# Patient Record
Sex: FEMALE | Race: WHITE | NOT HISPANIC OR LATINO | Employment: OTHER | URBAN - METROPOLITAN AREA
[De-identification: names, ages, dates, MRNs, and addresses within clinical notes are randomized per-mention and may not be internally consistent; named-entity substitution may affect disease eponyms.]

---

## 2017-03-05 ENCOUNTER — PATIENT OUTREACH (OUTPATIENT)
Dept: HEALTH INFORMATION MANAGEMENT | Facility: OTHER | Age: 77
End: 2017-03-05

## 2017-03-06 NOTE — PROGRESS NOTES
Attempt #:1     Annual Wellness Visit Scheduling  1. Scheduling Status:Scheduled             MyChart Activation: already active

## 2019-06-20 PROBLEM — C34.90 MALIGNANT NEOPLASM OF LUNG (HCC): Status: ACTIVE | Noted: 2019-06-20
